# Patient Record
Sex: FEMALE | Race: WHITE | NOT HISPANIC OR LATINO | Employment: STUDENT | URBAN - METROPOLITAN AREA
[De-identification: names, ages, dates, MRNs, and addresses within clinical notes are randomized per-mention and may not be internally consistent; named-entity substitution may affect disease eponyms.]

---
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
## 2025-07-02 ENCOUNTER — OFFICE VISIT (OUTPATIENT)
Dept: FAMILY MEDICINE CLINIC | Facility: CLINIC | Age: 20
End: 2025-07-02
Payer: COMMERCIAL

## 2025-07-02 VITALS
BODY MASS INDEX: 36.54 KG/M2 | SYSTOLIC BLOOD PRESSURE: 120 MMHG | DIASTOLIC BLOOD PRESSURE: 70 MMHG | OXYGEN SATURATION: 99 % | HEART RATE: 101 BPM | TEMPERATURE: 97.7 F | WEIGHT: 214 LBS | HEIGHT: 64 IN | RESPIRATION RATE: 16 BRPM

## 2025-07-02 DIAGNOSIS — F41.9 ANXIETY: ICD-10-CM

## 2025-07-02 DIAGNOSIS — N94.6 MENSTRUAL CRAMPS: ICD-10-CM

## 2025-07-02 DIAGNOSIS — L73.9 FOLLICULITIS: ICD-10-CM

## 2025-07-02 DIAGNOSIS — Z00.00 ANNUAL PHYSICAL EXAM: Primary | ICD-10-CM

## 2025-07-02 PROCEDURE — 99204 OFFICE O/P NEW MOD 45 MIN: CPT | Performed by: FAMILY MEDICINE

## 2025-07-02 PROCEDURE — 99395 PREV VISIT EST AGE 18-39: CPT | Performed by: FAMILY MEDICINE

## 2025-07-02 RX ORDER — ESCITALOPRAM OXALATE 10 MG/1
TABLET ORAL
COMMUNITY
End: 2025-07-02 | Stop reason: SDUPTHER

## 2025-07-02 RX ORDER — CEPHALEXIN 500 MG/1
500 CAPSULE ORAL 2 TIMES DAILY
Qty: 14 CAPSULE | Refills: 0 | Status: SHIPPED | OUTPATIENT
Start: 2025-07-02 | End: 2025-07-09

## 2025-07-02 RX ORDER — MUPIROCIN 2 %
OINTMENT (GRAM) TOPICAL 2 TIMES DAILY
Qty: 30 G | Refills: 0 | Status: SHIPPED | OUTPATIENT
Start: 2025-07-02

## 2025-07-02 RX ORDER — ESCITALOPRAM OXALATE 10 MG/1
10 TABLET ORAL DAILY
Qty: 90 TABLET | Refills: 1 | Status: SHIPPED | OUTPATIENT
Start: 2025-07-02

## 2025-07-02 NOTE — PROGRESS NOTES
Adult Annual Physical  Name: Zee Chandra      : 2005      MRN: [id number]  Encounter Provider: Florencia Roldan MD  Encounter Date: 2025   Encounter department: [facility]    :  Assessment & Plan  Annual physical exam  Continue working on healthy diet/exercise habits  Reviewed vaccine record which patient will send in via [employer]- pt is utd.  Check baseline labs  Declines STD screening.  Reviewed importance of contraception and std prevention, avoidance of vaping or smoking, safe etoh use, avoid riding with unsafe drivers, etc.          Anxiety  Continue lexapro 10 mg daily  Continue f/u with therapist.  Given that she is doing well, ok to f/u in six months, but sooner if needed or if has concerns.  She feels more comfortable now that she is living at home and commuting to [place] rather than living away at college farther away    Orders:  •  escitalopram (LEXAPRO) 10 mg tablet; Take 1 tablet (10 mg total) by mouth daily    Folliculitis  Prescription for keflex sent to pharmacy  Bactroban for larger lesions  Use Dial soap 1-2 times per week in shower  Can consider acne wash on body (benzoyl peroxide or salicylic acid on affected areas)  Orders:  •  cephalexin (KEFLEX) 500 mg capsule; Take 1 capsule (500 mg total) by mouth in the morning and 1 capsule (500 mg total) before bedtime. Do all this for 7 days.  •  mupirocin (BACTROBAN) 2 % ointment; Apply topically 2 (two) times a day As needed for pimple-like lesions    Menstrual cramps  - Takes Advil for pain relief, but cramping remains significant  - Discussed potential use of birth control pills, IUDs, or Nexplanon to regulate menstrual cycle and reduce cramping  - Recommend [facility] for further discussion and management if considering IUD/implant               Preventive Screenings:  - Diabetes Screening: orders placed  - Cholesterol Screening: orders placed   - Chlamydia Screening: patient declines   - Hepatitis C  screening: patient declines   - HIV screening: patient declines   - Cervical cancer screening: screening not indicated   - Colon cancer screening: screening not indicated   - Lung cancer screening: screening not indicated          History of Present Illness     Adult Annual Physical:  Patient presents for annual physical.     Diet and Physical Activity:  - Diet/Nutrition: well balanced diet.  - Exercise: 5-7 times a week on average. pickleball    Depression Screening:  - PHQ-2 Score: 1    General Health:  - Sleep: sleeps well.  - Hearing: normal hearing bilateral ears.  - Vision: no vision problems.  - Dental: regular dental visits.    /GYN Health:  - Follows with GYN: no.   - Contraception: barrier methods. regular, cramping first two days.  lasts 4-5 days.  ibuprofen prn      History of Present Illness  The patient presents for NP appointment for annual PE and anxiety    Anxiety  - She has been experiencing anxiety throughout her life, which is currently managed with Lexapro.  - She has been on this medication for approximately a year and reports it is effective.  - She is also seeing a therapist.  - Her previous pediatrician suggested consulting a psychiatrist for her medication management.  - She reports no symptoms of depression.  - She has 21 tablets of Lexapro left.    Plantar Fasciitis  - She has been dealing with plantar fasciitis, which has affected her arches.  - She has sustained multiple fractures to her big toe due to soccer-related injuries.  - She is under the care of a podiatrist for these issues.    Menstrual Cramps  - She experiences severe menstrual cramps during the first two days of her cycle, which can be debilitating.  - Her menstrual cycle typically lasts 4 to 5 days.  - She takes Advil to manage the pain.  - Her periods cause significant anxiety due to the associated bleeding.  - she is considering mirena vs nexplanon    Intermittent Stomach Issues  - She has been experiencing  "intermittent stomach issues for the past 2 to 3 years.  - She suspects lactose intolerance and takes Lactaid, which provides some relief.  - She still experiences occasional stomach pain.  - Her symptoms were minimal while she was in [place].  - She describes episodes of severe stomach pain lasting up to a week, accompanied by gas and either constipation or diarrhea.  - She does not experience nausea or vomiting.    Supplemental information: She has had a blood test done in the past due to ankle issues, which revealed high uric acid levels. However, a repeat test a week later showed normal levels. She is not currently sexually active but has been in the past (male partner). She always uses condoms when sexually active.   She does not smoke or vape. She only consumed alcohol while in [place] where it was legal.   She was walking a lot when in [place] for a study abroad opportunity this summer.   She usually goes to the gym almost every day and plays pickleball. She sleeps well overall, although she is still adjusting to the time difference after returning from [place]. She does not wear glasses or contacts regularly. She visits the dentist regularly now, although she did not for 6 years when she was young  Review of Systems      Objective   /70 (BP Location: Right arm, Patient Position: Sitting, Cuff Size: Large)   Pulse 101   Temp 97.7 °F (36.5 °C)   Resp 16   Ht 5' 4.34\" (1.634 m)   Wt 97.1 kg (214 lb)   SpO2 99%   BMI 36.35 kg/m²     Physical Exam  Vitals and nursing note reviewed.   Constitutional:       General: She is not in acute distress.     Appearance: Normal appearance. She is well-developed. She is not ill-appearing.   HENT:      Head: Normocephalic and atraumatic.      Nose: Nose normal.      Mouth/Throat:      Mouth: Mucous membranes are moist.     Eyes:      Conjunctiva/sclera: Conjunctivae normal.     Neck:      Vascular: No carotid bruit.     Cardiovascular:      Rate and Rhythm: Normal " rate and regular rhythm.      Heart sounds: No murmur heard.  Pulmonary:      Effort: Pulmonary effort is normal. No respiratory distress.      Breath sounds: Normal breath sounds.   Abdominal:      Palpations: Abdomen is soft.      Tenderness: There is no abdominal tenderness.     Musculoskeletal:      Cervical back: Neck supple.      Right lower leg: No edema.      Left lower leg: No edema.   Lymphadenopathy:      Cervical: No cervical adenopathy.     Skin:     General: Skin is warm and dry.      Comments: Patch of small papules and tiny pustules, minimal erythema- notes she has had this in the past as well     Neurological:      Mental Status: She is alert and oriented to person, place, and time.     Psychiatric:         Mood and Affect: Mood normal.         Behavior: Behavior normal.

## 2025-07-02 NOTE — ASSESSMENT & PLAN NOTE
Continue lexapro 10 mg daily  Continue f/u with therapist.  Given that she is doing well, ok to f/u in six months, but sooner if needed or if has concerns.  She feels more comfortable now that she is living at home and commuting to [place] rather than living away at college farther away    Orders:  •  escitalopram (LEXAPRO) 10 mg tablet; Take 1 tablet (10 mg total) by mouth daily

## 2025-07-28 ENCOUNTER — APPOINTMENT (OUTPATIENT)
Dept: RADIOLOGY | Facility: AMBULARY SURGERY CENTER | Age: 20
End: 2025-07-28
Attending: STUDENT IN AN ORGANIZED HEALTH CARE EDUCATION/TRAINING PROGRAM
Payer: COMMERCIAL

## 2025-07-28 ENCOUNTER — OFFICE VISIT (OUTPATIENT)
Dept: OBGYN CLINIC | Facility: CLINIC | Age: 20
End: 2025-07-28
Payer: COMMERCIAL

## 2025-07-28 VITALS — HEIGHT: 64 IN | WEIGHT: 214 LBS | BODY MASS INDEX: 36.54 KG/M2

## 2025-07-28 DIAGNOSIS — M25.561 RIGHT KNEE PAIN, UNSPECIFIED CHRONICITY: ICD-10-CM

## 2025-07-28 DIAGNOSIS — M76.31 ILIOTIBIAL BAND SYNDROME OF RIGHT SIDE: ICD-10-CM

## 2025-07-28 DIAGNOSIS — M25.561 RIGHT KNEE PAIN, UNSPECIFIED CHRONICITY: Primary | ICD-10-CM

## 2025-07-28 PROCEDURE — 73562 X-RAY EXAM OF KNEE 3: CPT

## 2025-07-28 PROCEDURE — 99203 OFFICE O/P NEW LOW 30 MIN: CPT | Performed by: STUDENT IN AN ORGANIZED HEALTH CARE EDUCATION/TRAINING PROGRAM

## 2025-08-08 ENCOUNTER — OFFICE VISIT (OUTPATIENT)
Age: 20
End: 2025-08-08
Payer: COMMERCIAL

## 2025-08-08 VITALS — WEIGHT: 228 LBS | DIASTOLIC BLOOD PRESSURE: 80 MMHG | SYSTOLIC BLOOD PRESSURE: 118 MMHG | BODY MASS INDEX: 38.72 KG/M2

## 2025-08-08 DIAGNOSIS — Z30.09 BIRTH CONTROL COUNSELING: Primary | ICD-10-CM

## 2025-08-08 DIAGNOSIS — N94.6 DYSMENORRHEA: ICD-10-CM

## 2025-08-08 PROCEDURE — 99203 OFFICE O/P NEW LOW 30 MIN: CPT

## 2025-08-08 RX ORDER — MEDROXYPROGESTERONE ACETATE 150 MG/ML
150 INJECTION, SUSPENSION INTRAMUSCULAR
Qty: 1 ML | Refills: 4 | Status: SHIPPED | OUTPATIENT
Start: 2025-08-08

## 2025-08-14 ENCOUNTER — CLINICAL SUPPORT (OUTPATIENT)
Dept: OBGYN CLINIC | Facility: CLINIC | Age: 20
End: 2025-08-14
Payer: COMMERCIAL